# Patient Record
Sex: MALE | Race: WHITE | Employment: STUDENT | URBAN - METROPOLITAN AREA
[De-identification: names, ages, dates, MRNs, and addresses within clinical notes are randomized per-mention and may not be internally consistent; named-entity substitution may affect disease eponyms.]

---
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
## 2020-03-27 RX ORDER — FLUTICASONE PROPIONATE AND SALMETEROL 100; 50 UG/1; UG/1
POWDER RESPIRATORY (INHALATION)
Qty: 1 EACH | Refills: 1 | Status: SHIPPED | OUTPATIENT
Start: 2020-03-27 | End: 2020-11-10

## 2020-03-27 NOTE — TELEPHONE ENCOUNTER
Medication(s) to Refill:   Requested Prescriptions     Pending Prescriptions Disp Refills   • WIXELA INHUB 100-50 MCG/DOSE Inhalation Aerosol Powder, Breath Activated [Pharmacy Med Name: Leann Wolf 100-50 INHUB]  1     Sig: INHALE 1 PUFF BY MOUTH TWICE A DAY

## 2020-08-04 RX ORDER — FLUTICASONE PROPIONATE AND SALMETEROL 100; 50 UG/1; UG/1
POWDER RESPIRATORY (INHALATION)
Qty: 60 EACH | Refills: 1 | OUTPATIENT
Start: 2020-08-04

## 2020-08-17 ENCOUNTER — OFFICE VISIT (OUTPATIENT)
Dept: FAMILY MEDICINE CLINIC | Facility: CLINIC | Age: 20
End: 2020-08-17
Payer: COMMERCIAL

## 2020-08-17 VITALS
OXYGEN SATURATION: 96 % | SYSTOLIC BLOOD PRESSURE: 118 MMHG | HEIGHT: 71 IN | HEART RATE: 92 BPM | RESPIRATION RATE: 16 BRPM | DIASTOLIC BLOOD PRESSURE: 70 MMHG | TEMPERATURE: 98 F | WEIGHT: 188 LBS | BODY MASS INDEX: 26.32 KG/M2

## 2020-08-17 DIAGNOSIS — J45.30 MILD PERSISTENT ASTHMA WITHOUT COMPLICATION: ICD-10-CM

## 2020-08-17 DIAGNOSIS — Z00.00 ANNUAL PHYSICAL EXAM: Primary | ICD-10-CM

## 2020-08-17 DIAGNOSIS — Z00.00 LABORATORY EXAMINATION ORDERED AS PART OF A ROUTINE GENERAL MEDICAL EXAMINATION: ICD-10-CM

## 2020-08-17 PROCEDURE — 3074F SYST BP LT 130 MM HG: CPT | Performed by: FAMILY MEDICINE

## 2020-08-17 PROCEDURE — 99395 PREV VISIT EST AGE 18-39: CPT | Performed by: FAMILY MEDICINE

## 2020-08-17 PROCEDURE — 3008F BODY MASS INDEX DOCD: CPT | Performed by: FAMILY MEDICINE

## 2020-08-17 PROCEDURE — 3078F DIAST BP <80 MM HG: CPT | Performed by: FAMILY MEDICINE

## 2020-08-19 NOTE — PROGRESS NOTES
HPI:     Patient presents with:  Physical   Asthma f/u. Type of asthma:   The patient presents for recheck of asthma sx's. Lately the patient's asthma has been  under good control. When symptoms occur they are described as non-productive cough.  Patient Maternal Aunt       Social History    Tobacco Use      Smoking status: Never Smoker      Smokeless tobacco: Never Used    Alcohol use: No      Alcohol/week: 0.0 standard drinks    Drug use: No        REVIEW OF SYSTEMS:   GENERAL: feels well otherwise, no f LIPID PANEL; Future  - TSH W REFLEX TO FREE T4; Future    3. Mild persistent asthma without complication  -stable, CPM  The patient indicates understanding of these issues and agrees to the plan.   The patient is asked to return in one week if sx's persist

## 2020-08-22 ENCOUNTER — LAB ENCOUNTER (OUTPATIENT)
Dept: LAB | Age: 20
End: 2020-08-22
Attending: FAMILY MEDICINE
Payer: COMMERCIAL

## 2020-08-22 DIAGNOSIS — Z00.00 LABORATORY EXAMINATION ORDERED AS PART OF A ROUTINE GENERAL MEDICAL EXAMINATION: ICD-10-CM

## 2020-08-22 LAB
ALBUMIN SERPL-MCNC: 4.2 G/DL (ref 3.4–5)
ALBUMIN/GLOB SERPL: 1.4 {RATIO} (ref 1–2)
ALP LIVER SERPL-CCNC: 55 U/L (ref 45–117)
ALT SERPL-CCNC: 30 U/L (ref 16–61)
ANION GAP SERPL CALC-SCNC: 4 MMOL/L (ref 0–18)
AST SERPL-CCNC: 16 U/L (ref 15–37)
BASOPHILS # BLD AUTO: 0.04 X10(3) UL (ref 0–0.2)
BASOPHILS NFR BLD AUTO: 0.5 %
BILIRUB SERPL-MCNC: 0.7 MG/DL (ref 0.1–2)
BUN BLD-MCNC: 16 MG/DL (ref 7–18)
BUN/CREAT SERPL: 19.3 (ref 10–20)
CALCIUM BLD-MCNC: 8.9 MG/DL (ref 8.5–10.1)
CHLORIDE SERPL-SCNC: 108 MMOL/L (ref 98–112)
CHOLEST SMN-MCNC: 110 MG/DL (ref ?–200)
CO2 SERPL-SCNC: 26 MMOL/L (ref 21–32)
CREAT BLD-MCNC: 0.83 MG/DL (ref 0.7–1.3)
DEPRECATED RDW RBC AUTO: 43.6 FL (ref 35.1–46.3)
EOSINOPHIL # BLD AUTO: 0.88 X10(3) UL (ref 0–0.7)
EOSINOPHIL NFR BLD AUTO: 11.2 %
ERYTHROCYTE [DISTWIDTH] IN BLOOD BY AUTOMATED COUNT: 12.8 % (ref 11–15)
GLOBULIN PLAS-MCNC: 3.1 G/DL (ref 2.8–4.4)
GLUCOSE BLD-MCNC: 94 MG/DL (ref 70–99)
HCT VFR BLD AUTO: 46.2 % (ref 39–53)
HDLC SERPL-MCNC: 40 MG/DL (ref 40–59)
HGB BLD-MCNC: 14.6 G/DL (ref 13–17.5)
IMM GRANULOCYTES # BLD AUTO: 0.01 X10(3) UL (ref 0–1)
IMM GRANULOCYTES NFR BLD: 0.1 %
LDLC SERPL CALC-MCNC: 57 MG/DL (ref ?–100)
LYMPHOCYTES # BLD AUTO: 2.29 X10(3) UL (ref 1–4)
LYMPHOCYTES NFR BLD AUTO: 29.1 %
M PROTEIN MFR SERPL ELPH: 7.3 G/DL (ref 6.4–8.2)
MCH RBC QN AUTO: 29 PG (ref 26–34)
MCHC RBC AUTO-ENTMCNC: 31.6 G/DL (ref 31–37)
MCV RBC AUTO: 91.7 FL (ref 80–100)
MONOCYTES # BLD AUTO: 0.81 X10(3) UL (ref 0.1–1)
MONOCYTES NFR BLD AUTO: 10.3 %
NEUTROPHILS # BLD AUTO: 3.85 X10 (3) UL (ref 1.5–7.7)
NEUTROPHILS # BLD AUTO: 3.85 X10(3) UL (ref 1.5–7.7)
NEUTROPHILS NFR BLD AUTO: 48.8 %
NONHDLC SERPL-MCNC: 70 MG/DL (ref ?–130)
OSMOLALITY SERPL CALC.SUM OF ELEC: 287 MOSM/KG (ref 275–295)
PATIENT FASTING Y/N/NP: YES
PATIENT FASTING Y/N/NP: YES
PLATELET # BLD AUTO: 216 10(3)UL (ref 150–450)
POTASSIUM SERPL-SCNC: 4 MMOL/L (ref 3.5–5.1)
RBC # BLD AUTO: 5.04 X10(6)UL (ref 4.3–5.7)
SODIUM SERPL-SCNC: 138 MMOL/L (ref 136–145)
TRIGL SERPL-MCNC: 66 MG/DL (ref 30–149)
TSI SER-ACNC: 1.44 MIU/ML (ref 0.36–3.74)
VLDLC SERPL CALC-MCNC: 13 MG/DL (ref 0–30)
WBC # BLD AUTO: 7.9 X10(3) UL (ref 4–11)

## 2020-08-22 PROCEDURE — 36415 COLL VENOUS BLD VENIPUNCTURE: CPT | Performed by: FAMILY MEDICINE

## 2020-08-22 PROCEDURE — 80050 GENERAL HEALTH PANEL: CPT | Performed by: FAMILY MEDICINE

## 2020-08-22 PROCEDURE — 80061 LIPID PANEL: CPT | Performed by: FAMILY MEDICINE

## 2020-08-25 ENCOUNTER — TELEPHONE (OUTPATIENT)
Dept: FAMILY MEDICINE CLINIC | Facility: CLINIC | Age: 20
End: 2020-08-25

## 2020-08-25 NOTE — TELEPHONE ENCOUNTER
----- Message from Randy Nassar MD sent at 8/24/2020 11:14 AM CDT -----  Normal.    Result letter mailed to pt

## 2020-11-10 RX ORDER — FLUTICASONE PROPIONATE AND SALMETEROL 100; 50 UG/1; UG/1
1 POWDER RESPIRATORY (INHALATION) 2 TIMES DAILY
Qty: 1 EACH | Refills: 1 | Status: SHIPPED | OUTPATIENT
Start: 2020-11-10 | End: 2021-01-22

## 2020-11-10 NOTE — TELEPHONE ENCOUNTER
Pt requesting Irene Babcock 100-50 MCG/DOSE Inhalation Aerosol Powder, Breath Activated   With at least one refill,[facility] 8[facility]17-20

## 2021-01-21 NOTE — TELEPHONE ENCOUNTER
Pt calling in, needing a refill on his fluticasone-salmeterol (Jaime Allyn) 100-50 MCG/DOSE Inhalation Aerosol Powder, Breath Activated to be sent to [address] [place] [address], [address] [place] [address] Suhas Salazar [phone], [phone].  Pt is wanting to know if

## 2021-01-21 NOTE — TELEPHONE ENCOUNTER
Pt requesting for medication refill of Fluticasone-Salmeterol 100-50 MCG/DOSE Inhalation Aerosol Powder Breath Activated (Cecilia Coulter). Pt also asking for more than 1 refill. Please approve or deny. Thank you.    LOV:8/17/2020  LF: 11/10/2020

## 2021-01-22 RX ORDER — FLUTICASONE PROPIONATE AND SALMETEROL 100; 50 UG/1; UG/1
1 POWDER RESPIRATORY (INHALATION) 2 TIMES DAILY
Qty: 1 EACH | Refills: 1 | Status: SHIPPED | OUTPATIENT
Start: 2021-01-22 | End: 2021-04-23

## 2021-04-06 ENCOUNTER — TELEPHONE (OUTPATIENT)
Dept: FAMILY MEDICINE CLINIC | Facility: CLINIC | Age: 21
End: 2021-04-06

## 2021-04-06 NOTE — TELEPHONE ENCOUNTER
Approvedtoday  YNFOXH:[id number];TAALMQ:[id number]; Review Type:Prior Auth; Coverage Start Date:03/07/2021; Coverage End Date:04/05/2024;

## 2021-04-21 RX ORDER — FLUTICASONE PROPIONATE AND SALMETEROL 100; 50 UG/1; UG/1
1 POWDER RESPIRATORY (INHALATION) 2 TIMES DAILY
Qty: 1 EACH | Refills: 1 | Status: CANCELLED | OUTPATIENT
Start: 2021-04-21

## 2021-04-21 NOTE — TELEPHONE ENCOUNTER
Went to [facility] for [facility], was told meds were denied.      See TE from 4/6/21, there was a prior auth done     fluticasone-salmeterol [facility]) 100-50 MCG/DOSE Inhalation Aerosol Powder, Breath Activated    Last OV 8/2020     Patient is completely out of hi

## 2021-04-21 NOTE — TELEPHONE ENCOUNTER
Rx last sent to the pharmacy for fluticasone-salmeterol (Zeeshan Santacruz) 100-50 MCG/DOSE Inhalation Aerosol Powder, Breath Activated on 01/22/2021 - dispense 1 refill 1. PA completed and Approved, See TE 04/06/2021.       Called and spoke with  pt's local

## 2021-04-23 ENCOUNTER — TELEPHONE (OUTPATIENT)
Dept: FAMILY MEDICINE CLINIC | Facility: CLINIC | Age: 21
End: 2021-04-23

## 2021-04-23 ENCOUNTER — OFFICE VISIT (OUTPATIENT)
Dept: FAMILY MEDICINE CLINIC | Facility: CLINIC | Age: 21
End: 2021-04-23
Payer: COMMERCIAL

## 2021-04-23 VITALS
RESPIRATION RATE: 16 BRPM | TEMPERATURE: 97 F | HEART RATE: 90 BPM | SYSTOLIC BLOOD PRESSURE: 122 MMHG | WEIGHT: 185 LBS | DIASTOLIC BLOOD PRESSURE: 76 MMHG | BODY MASS INDEX: 25.9 KG/M2 | OXYGEN SATURATION: 96 % | HEIGHT: 71 IN

## 2021-04-23 DIAGNOSIS — J45.41 MODERATE PERSISTENT ASTHMA WITH EXACERBATION: Primary | ICD-10-CM

## 2021-04-23 PROCEDURE — 99214 OFFICE O/P EST MOD 30 MIN: CPT | Performed by: FAMILY MEDICINE

## 2021-04-23 PROCEDURE — 3008F BODY MASS INDEX DOCD: CPT | Performed by: FAMILY MEDICINE

## 2021-04-23 PROCEDURE — 3074F SYST BP LT 130 MM HG: CPT | Performed by: FAMILY MEDICINE

## 2021-04-23 PROCEDURE — 3078F DIAST BP <80 MM HG: CPT | Performed by: FAMILY MEDICINE

## 2021-04-23 RX ORDER — FLUTICASONE PROPIONATE AND SALMETEROL 100; 50 UG/1; UG/1
1 POWDER RESPIRATORY (INHALATION) 2 TIMES DAILY
Qty: 1 EACH | Refills: 1 | Status: SHIPPED | OUTPATIENT
Start: 2021-04-23 | End: 2021-04-23

## 2021-04-23 RX ORDER — FLUTICASONE PROPIONATE AND SALMETEROL 100; 50 UG/1; UG/1
1 POWDER RESPIRATORY (INHALATION) 2 TIMES DAILY
Qty: 2 EACH | Refills: 1 | Status: SHIPPED | OUTPATIENT
Start: 2021-04-23 | End: 2021-05-15

## 2021-04-23 RX ORDER — MONTELUKAST SODIUM 10 MG/1
10 TABLET ORAL DAILY
Qty: 30 TABLET | Refills: 3 | Status: SHIPPED | OUTPATIENT
Start: 2021-04-23 | End: 2021-07-19

## 2021-04-23 RX ORDER — PREDNISONE 20 MG/1
40 TABLET ORAL DAILY
Qty: 6 TABLET | Refills: 0 | Status: SHIPPED | OUTPATIENT
Start: 2021-04-23 | End: 2021-04-26

## 2021-04-23 NOTE — PROGRESS NOTES
HPI/Subjective:   Katy Dye is a 24year old male who presents for Asthma     The patient presents for recheck of asthma sx's. Lately the patient's asthma has been  under good control. When symptoms occur they are described as non-productive cough.  Venecia sounds: Wheezing and rhonchi present. Abdominal:      General: Bowel sounds are normal.      Palpations: Abdomen is soft. Skin:     General: Skin is warm and dry. Neurological:      Mental Status: He is alert and oriented to person, place, and time.

## 2021-04-23 NOTE — TELEPHONE ENCOUNTER
PA started on CMM; however, received message Vince Tompkins active PA is already on file with expiration date of 04/05/2024.  Please wait to resubmit request within 60 days of that expiration date to obtain a PA renewal.\"

## 2021-04-23 NOTE — TELEPHONE ENCOUNTER
Received verbal order from PCP. [facility]. Medication order sent to pharmacy.

## 2021-04-23 NOTE — TELEPHONE ENCOUNTER
Spoke with Kishan Law, the pharmacy technician at [facility].  Kishan Law states that the order still states that the medication needs prior approval. (see TE dated 4/6/2021- medication approved.) Kishan Law states that the pharmacist states to resend the order to see i

## 2021-04-23 NOTE — TELEPHONE ENCOUNTER
Spoke with Nely Bullock at [facility]. Notified Nely Bullock that PA was approved through [employer]. Nely Bullock states that she ran the order through the patient's insurance and the medication was approved for $48 out of pocket expense.  Nely Bullock states that she

## 2021-04-23 NOTE — TELEPHONE ENCOUNTER
Patient calling stating that he still is unable to receive Flonnie Cables from [facility]. States that the pharmacy states that the providers office needs to approve order. Patient states that he is out of the medication.   Denies SOB or difficulty breathi

## 2021-04-23 NOTE — TELEPHONE ENCOUNTER
Spoke with Felice at [facility]. PA completed over the phone.  (Felice unable to locate PA for medication.)    Approved through 4/22/2024  Authorization number: [id number]

## 2021-04-23 NOTE — TELEPHONE ENCOUNTER
Notified the patient of PA approval and medication will be ready for  today (per pharmacy). Patient verbalized understanding. Notified the patient of need for follow up (per OV note, patient to follow up in 3-6 months).  Patient verbalized underst

## 2021-04-23 NOTE — TELEPHONE ENCOUNTER
Spoke with Sharri Doherty at [facility]. Sharri Doherty states that the order still says that the medication needs a prior PA. Requested PA be faxed to the office.

## 2021-04-29 ENCOUNTER — TELEPHONE (OUTPATIENT)
Dept: FAMILY MEDICINE CLINIC | Facility: CLINIC | Age: 21
End: 2021-04-29

## 2021-04-29 NOTE — TELEPHONE ENCOUNTER
Per CMM response: med is [place] by [employer] [place] and no further documentation is necessary.

## 2021-05-15 DIAGNOSIS — J45.41 MODERATE PERSISTENT ASTHMA WITH EXACERBATION: ICD-10-CM

## 2021-05-15 RX ORDER — FLUTICASONE PROPIONATE AND SALMETEROL 100; 50 UG/1; UG/1
POWDER RESPIRATORY (INHALATION)
Qty: 1 PACKAGE | Refills: 3 | Status: SHIPPED | OUTPATIENT
Start: 2021-05-15

## 2021-07-19 DIAGNOSIS — J45.41 MODERATE PERSISTENT ASTHMA WITH EXACERBATION: ICD-10-CM

## 2021-07-19 RX ORDER — MONTELUKAST SODIUM 10 MG/1
TABLET ORAL
Qty: 90 TABLET | Refills: 1 | Status: SHIPPED | OUTPATIENT
Start: 2021-07-19

## (undated) NOTE — ED AVS SNAPSHOT
Kory Damian   MRN: [id number]    Department:  [facility] in [place]   Date of Visit:  10/26/2018           Disclosure     Insurance plans vary and the physician(s) referred by the ER may not be covered by your plan.  Please contact tell this physician (or your personal doctor if your instructions are to return to your personal doctor) about any new or lasting problems. The primary care or specialist physician will see patients referred from the [facility] Emergency Department.  Sonam Senior

## (undated) NOTE — LETTER
09/05/19        Naye Rodriguez      Dear Carola George,    [facility] records indicate that you have outstanding lab work and or testing that was ordered for you and has not yet been completed:  Orders Placed This Encounter      CBC With AKIN

## (undated) NOTE — LETTER
August 25, 2020    Gara Search  [id number] [facility] [address]      Dear Jenifer Gleason:    The following are the results of your recent tests. Please review the list of test results.   Your result is the value on the left; we have also supplied the range of norm Lymphocyte Absolute 2.29 1.00 - 4.00 x10(3) uL    Monocyte Absolute 0.81 0.10 - 1.00 x10(3) uL    Eosinophil Absolute 0.88 (H) 0.00 - 0.70 x10(3) uL    Basophil Absolute 0.04 0.00 - 0.20 x10(3) uL    Immature Granulocyte Absolute 0.01 0.00 - 1.00 x10(3) u